# Patient Record
(demographics unavailable — no encounter records)

---

## 2024-10-11 NOTE — PHYSICAL EXAM
[General Appearance - Alert] : alert [General Appearance - In No Acute Distress] : in no acute distress [2+] : left foot posterior tibialis 2+ [1+] : left foot dorsalis pedis 1+ [Diminished Throughout Right Foot] : diminished sensation with monofilament testing throughout right foot [Diminished Throughout Left Foot] : diminished sensation with monofilament testing throughout left foot [Oriented To Time, Place, And Person] : oriented to person, place, and time [Impaired Insight] : insight and judgment were intact [Affect] : the affect was normal [Ankle Swelling (On Exam)] : not present [] : not present [Delayed in the Right Toes] : capillary refills normal in right toes [Delayed in the Left Toes] : capillary refills normal in the left toes [de-identified] : s/p left partial hallux amputation Semi-flexible left 2nd digital contracture, clawing Good alignment with normal arch height. Right ankle gastrocsoleal equinus. No pain on palpation to right calf and along the Achilles tendon Muscle Strength feet bilateral, 5/5. [FreeTextEntry1] : Pre-ulcerative hyperkeratosis to right hallux plantar medial IPJ and Left 2nd digit hyperkeratosis distal tip. No underlying opening upon debridement Nails R1-5, L2-5 painful, thickened, discolored, dystrophic with subungual debris. No open ulcerations or breaks in the integument bilateral.    well-healed surgical scar left residual hallux [Vibration Dec.] : normal vibratory sensation at the level of the toes

## 2024-10-11 NOTE — HISTORY OF PRESENT ILLNESS
[Sneakers] : santo [FreeTextEntry1] : Patient states he has no concerns and denies any pain. Patient tried on shoes with left toe filler and denies any discomfort.  Reports "vein/nerve" pain to the right lower leg at times  FBS:107  A1C; about 7  s/p left partial hallux amputation (8/1/23)

## 2024-10-11 NOTE — ASSESSMENT
[FreeTextEntry1] : Discussed diagnosis and treatment with patient Discussed etiology of symptoms patient is experiencing Aseptic debridement of painful hyperkeratotic lesions right hallux and left 2nd toe down to and including epidermal layer with sterile 15-blade s/p 9/10/24 aseptic debridement of mycotic toenail R1-5 and L2-5 with sterile nipper Discussed the importance of daily foot exams Discussed the importance of daily glucose control Discussed all signs and symptoms of local and regional infection and if they arise patient advised to come into the office or go to the emergency room. Continue diabetic shoes/inserts - 11/21/23 Continue amlactin Recommended over the counter Vitamin B-complex supplements Recommended over the counter toe sleeves  Discussed risk and benefits of flexor tenotomy left second toe due to persistent callus formation and chronic pain  return for follow up appointment 10 weeks from last visit for routine

## 2025-01-21 NOTE — HISTORY OF PRESENT ILLNESS
[Sneakers] : santo [FreeTextEntry1] : Patient states he has no concerns and denies any pain. Patient tried on shoes with left toe filler and denies any discomfort.  Complains of burning sensation sometimes on b/l feet. states his own a1c is good  FBS:97  A1C; about 7  s/p left partial hallux amputation (8/1/23)

## 2025-01-21 NOTE — REASON FOR VISIT
[Follow-Up Visit] : a follow-up visit for [FreeTextEntry2] : diabetic foot examination and left 2nd hammertoe

## 2025-01-21 NOTE — PHYSICAL EXAM
[General Appearance - Alert] : alert [General Appearance - In No Acute Distress] : in no acute distress [2+] : left foot posterior tibialis 2+ [1+] : left foot dorsalis pedis 1+ [Diminished Throughout Right Foot] : diminished sensation with monofilament testing throughout right foot [Diminished Throughout Left Foot] : diminished sensation with monofilament testing throughout left foot [Oriented To Time, Place, And Person] : oriented to person, place, and time [Impaired Insight] : insight and judgment were intact [Affect] : the affect was normal [Ankle Swelling (On Exam)] : not present [] : not present [Delayed in the Right Toes] : capillary refills normal in right toes [Delayed in the Left Toes] : capillary refills normal in the left toes [de-identified] : s/p left partial hallux amputation Semi-reducible left 2nd digital contracture, clawing Good alignment with normal arch height. Right ankle gastrocsoleal equinus. No pain on palpation to right calf and along the Achilles tendon Muscle Strength feet bilateral, 5/5. [FreeTextEntry1] : Pre-ulcerative hyperkeratotic lesions to right hallux plantar medial IPJ and Left 2nd digit hyperkeratosis distal tip. No underlying opening upon debridement Nails R1-5, L2-5 painful, thickened, discolored, dystrophic with subungual debris. No open ulcerations or breaks in the integument bilateral.    well-healed surgical scar left residual hallux [Vibration Dec.] : normal vibratory sensation at the level of the toes

## 2025-01-21 NOTE — ASSESSMENT
[FreeTextEntry1] : Discussed diagnosis and treatment with patient Discussed etiology of symptoms patient is experiencing 1/21/25 Aseptic debridement of painful hyperkeratotic lesions right hallux and left 2nd toe down to and including epidermal layer with sterile 15-blade 1/21/25 aseptic debridement of mycotic toenail R1-5 and L2-5 with sterile nipper Discussed the importance of daily foot exams and apply bandaid to left 2nd toe Discussed the importance of daily glucose control Discussed all signs and symptoms of local and regional infection and if they arise patient advised to come into the office or go to the emergency room. Continue diabetic shoes/inserts - 11/21/23 Continue amlactin Recommended over the counter Vitamin B-complex supplements Recommended over the counter toe sleeves Rx gabapentin 300 mg po QD at night Discussed risk and benefits of flexor tenotomy left second toe due to persistent callus formation and chronic pain. Patient is amenable to flexor tenotomy in 3 weeks return for follow up appointment 3 weeks for left 2nd flexor tenotomy

## 2025-03-14 NOTE — PHYSICAL EXAM
[General Appearance - Alert] : alert [General Appearance - In No Acute Distress] : in no acute distress [2+] : left foot posterior tibialis 2+ [1+] : left foot dorsalis pedis 1+ [Diminished Throughout Right Foot] : diminished sensation with monofilament testing throughout right foot [Diminished Throughout Left Foot] : diminished sensation with monofilament testing throughout left foot [Oriented To Time, Place, And Person] : oriented to person, place, and time [Impaired Insight] : insight and judgment were intact [Affect] : the affect was normal [Ankle Swelling (On Exam)] : not present [] : not present [Delayed in the Right Toes] : capillary refills normal in right toes [Delayed in the Left Toes] : capillary refills normal in the left toes [de-identified] : s/p left partial hallux amputation Semi-reducible left 2nd digital contracture, clawing Good alignment with normal arch height. Right ankle gastrocsoleal equinus. No pain on palpation to right calf and along the Achilles tendon Muscle Strength feet bilateral, 5/5. [FreeTextEntry1] : Fibrogranular wound bed noted to distal tip left 2nd toe Wound bed measuring 1x0.5x0.5 cm - 3/14/25. no purulence Nails R1-5, L2-5 painful, thickened, discolored, dystrophic with subungual debris. No open ulcerations or breaks in the integument bilateral.    well-healed surgical scar left residual hallux [Vibration Dec.] : normal vibratory sensation at the level of the toes

## 2025-03-14 NOTE — ASSESSMENT
[FreeTextEntry1] : Discussed diagnosis and treatment with patient Discussed etiology of symptoms patient is experiencing 1/21/25 Aseptic debridement of painful hyperkeratotic lesions right hallux and left 2nd toe down to and including epidermal layer with sterile 15-blade 1/21/25 aseptic debridement of mycotic toenail R1-5 and L2-5 with sterile nipper Ulcerative left 2nd toe distal tip lesion. Upon debridement 3/14/25, fibrogranular wound bed noted. Excisional debridement of left 2nd toe down to and including subq tissue with sterile nipper Hemostasis achieved with manual pressure and silver nitrate Applied SSD, gauze, light coban Patient to apply SSD and bandaid daily Discussed the importance of daily foot exams Discussed the importance of daily glucose control Discussed all signs and symptoms of local and regional infection and if they arise patient advised to come into the office or go to the emergency room. Continue diabetic shoes/inserts - 11/21/23 Continue amlactin Recommended over the counter Vitamin B-complex supplements Recommended over the counter toe sleeves Rx SSD Rx gabapentin 300 mg po QD at night Rx Augmentin 2 weeks Obtained/reviewed left foot xrays 3 views WB 3/14/25: cortical sclerosis 2nd distal phalanx. Increased soft tissue swelling left 2nd toe. No soft tissue emphysema. Discussed risk and benefits of flexor tenotomy left second toe due to persistent callus formation and chronic pain. Flexor tenotomy delayed due to reulceration. Will consider upon healing of new wound return for follow up appointment 1 week for wound care

## 2025-03-21 NOTE — REASON FOR VISIT
[Follow-Up Visit] : a follow-up visit for [Foot/Ankle Ulcer] : foot/ankle ulcer [FreeTextEntry2] : left 2nd toe wound

## 2025-03-21 NOTE — PHYSICAL EXAM
[General Appearance - Alert] : alert [General Appearance - In No Acute Distress] : in no acute distress [2+] : left foot posterior tibialis 2+ [1+] : left foot dorsalis pedis 1+ [Diminished Throughout Right Foot] : diminished sensation with monofilament testing throughout right foot [Diminished Throughout Left Foot] : diminished sensation with monofilament testing throughout left foot [Oriented To Time, Place, And Person] : oriented to person, place, and time [Impaired Insight] : insight and judgment were intact [Affect] : the affect was normal [Ankle Swelling (On Exam)] : not present [] : not present [Delayed in the Right Toes] : capillary refills normal in right toes [Delayed in the Left Toes] : capillary refills normal in the left toes [de-identified] : s/p left partial hallux amputation Semi-reducible left 2nd digital contracture, clawing Good alignment with normal arch height. Right ankle gastrocsoleal equinus. No pain on palpation to right calf and along the Achilles tendon Muscle Strength feet bilateral, 5/5. [FreeTextEntry1] : Fibrogranular wound bed noted to distal tip left 2nd toe Wound bed measuring 1x0.5x0.5 cm - 3/14/25. no purulence measures 1x0.2x0.3 cm - 3/21/25, epithelializing Nails R1-5, L2-5 painful, thickened, discolored, dystrophic with subungual debris. No open ulcerations or breaks in the integument bilateral.    well-healed surgical scar left residual hallux [Vibration Dec.] : normal vibratory sensation at the level of the toes

## 2025-03-21 NOTE — ASSESSMENT
[FreeTextEntry1] : Discussed diagnosis and treatment with patient Discussed etiology of symptoms patient is experiencing 1/21/25 Aseptic debridement of painful hyperkeratotic lesions right hallux and left 2nd toe down to and including epidermal layer with sterile 15-blade 1/21/25 aseptic debridement of mycotic toenail R1-5 and L2-5 with sterile nipper Ulcerative left 2nd toe distal tip lesion Excisional debridement of left 2nd toe down to and including subq tissue with sterile 15-blade. improving Hemostasis achieved with manual pressure and silver nitrate Applied SSD, gauze, light coban Patient to apply SSD and bandaid daily Discussed the importance of daily foot exams Discussed the importance of daily glucose control Discussed all signs and symptoms of local and regional infection and if they arise patient advised to come into the office or go to the emergency room. Continue diabetic shoes/inserts - 11/21/23 Continue amlactin Recommended over the counter Vitamin B-complex supplements Recommended over the counter toe sleeves Rx SSD Rx gabapentin 300 mg po QD at night Rx Augmentin 2 weeks Obtained/reviewed left foot xrays 3 views WB 3/14/25: cortical sclerosis 2nd distal phalanx. Increased soft tissue swelling left 2nd toe. No soft tissue emphysema. Discussed risk and benefits of flexor tenotomy left second toe due to persistent callus formation and chronic pain. Flexor tenotomy delayed due to reulceration. Will consider upon healing of new wound return for follow up appointment 1 week for wound care

## 2025-03-21 NOTE — HISTORY OF PRESENT ILLNESS
[Sneakers] : santo [FreeTextEntry1] : Presents in the [place] office for f/u of Ulcerative left 2nd toe distal tip lesion. He is taking po antibiotics and has been dressing it daily. Denies n/v/f/c  FBS:80 A1C; about 7  s/p left partial hallux amputation (8/1/23) reports minimal pain to left 2nd toe pain.

## 2025-03-28 NOTE — REASON FOR VISIT
Spoke with Mom. She reports that Macie is not sexually active but she is wanting to discuss OCP to aid Macie in planning for her menstrual cycle. I did advise a visit to discuss   [Follow-Up Visit] : a follow-up visit for [Foot/Ankle Ulcer] : foot/ankle ulcer [FreeTextEntry2] : left 2nd toe wound

## 2025-03-28 NOTE — PHYSICAL EXAM
[General Appearance - Alert] : alert [General Appearance - In No Acute Distress] : in no acute distress [2+] : left foot posterior tibialis 2+ [1+] : left foot dorsalis pedis 1+ [Diminished Throughout Right Foot] : diminished sensation with monofilament testing throughout right foot [Diminished Throughout Left Foot] : diminished sensation with monofilament testing throughout left foot [Oriented To Time, Place, And Person] : oriented to person, place, and time [Impaired Insight] : insight and judgment were intact [Affect] : the affect was normal [Ankle Swelling (On Exam)] : not present [] : not present [Delayed in the Right Toes] : capillary refills normal in right toes [Delayed in the Left Toes] : capillary refills normal in the left toes [de-identified] : s/p left partial hallux amputation Semi-reducible left 2nd digital contracture, clawing Good alignment with normal arch height. Right ankle gastrocsoleal equinus. No pain on palpation to right calf and along the Achilles tendon Muscle Strength feet bilateral, 5/5. [FreeTextEntry1] : Fibrogranular wound bed noted to distal tip left 2nd toe Wound bed measuring 1x0.5x0.5 cm - 3/14/25. no purulence measures 1x0.2x0.3 cm - 3/21/25, epithelializing Nails R1-5, L2-5 painful, thickened, discolored, dystrophic with subungual debris. No open ulcerations or breaks in the integument bilateral.    well-healed surgical scar left residual hallux [Vibration Dec.] : normal vibratory sensation at the level of the toes

## 2025-03-28 NOTE — ASSESSMENT
[FreeTextEntry1] : Discussed diagnosis and treatment with patient Discussed etiology of symptoms patient is experiencing 1/21/25 Aseptic debridement of painful hyperkeratotic lesions right hallux and left 2nd toe down to and including epidermal layer with sterile 15-blade 1/21/25 aseptic debridement of mycotic toenail R1-5 and L2-5 with sterile nipper Ulcerative left 2nd toe distal tip lesion Excisional debridement of left 2nd toe down to and including subq tissue with sterile 15-blade. improving Hemostasis achieved with manual pressure and silver nitrate Applied SSD, gauze, light coban Patient to apply SSD and bandaid daily Discussed the importance of daily foot exams Discussed the importance of daily glucose control Discussed all signs and symptoms of local and regional infection and if they arise patient advised to come into the office or go to the emergency room. Continue diabetic shoes/inserts - 11/21/23 Continue amlactin Recommended over the counter Vitamin B-complex supplements Recommended over the counter toe sleeves Rx SSD Rx gabapentin 300 mg po QD at night Rx Augmentin 2 weeks Obtained/reviewed left foot xrays 3 views WB 3/14/25: cortical sclerosis 2nd distal phalanx. Increased soft tissue swelling left 2nd toe. No soft tissue emphysema. Discussed risk and benefits of flexor tenotomy left second toe due to persistent callus formation and chronic pain. Flexor tenotomy delayed due to reulceration. Will consider upon healing of new wound return for follow up appointment 1 week for flexor tenotomy and wound repair

## 2025-03-28 NOTE — HISTORY OF PRESENT ILLNESS
[Sneakers] : santo [FreeTextEntry1] : Presents in the [place] office for f/u of Ulcerative left 2nd toe distal tip lesion. He is taking po antibiotics and has been dressing it daily with a band aid and SSD  . Denies n/v/f/c  FBS:103 A1C; about 7  s/p left partial hallux amputation (8/1/23) reports minimal pain to left 2nd toe pain.

## 2025-04-11 NOTE — HISTORY OF PRESENT ILLNESS
[Sneakers] : santo [FreeTextEntry1] : Presents in the [place] office for f/u left 2nd toe flexor tenotomy.  He is taking oral antibiotics and has been dressing it daily with a band aid and SSD  . Denies n/v/f/c   States he has minimal throbbing.  FBS:102 A1C; about 7  s/p left partial hallux amputation (8/1/23) reports minimal pain to left 2nd toe pain.

## 2025-04-11 NOTE — PHYSICAL EXAM
[General Appearance - Alert] : alert [General Appearance - In No Acute Distress] : in no acute distress [2+] : left foot posterior tibialis 2+ [1+] : left foot dorsalis pedis 1+ [Diminished Throughout Right Foot] : diminished sensation with monofilament testing throughout right foot [Diminished Throughout Left Foot] : diminished sensation with monofilament testing throughout left foot [Oriented To Time, Place, And Person] : oriented to person, place, and time [Impaired Insight] : insight and judgment were intact [Affect] : the affect was normal [Ankle Swelling (On Exam)] : not present [] : not present [Delayed in the Right Toes] : capillary refills normal in right toes [Delayed in the Left Toes] : capillary refills normal in the left toes [de-identified] : s/p left partial hallux 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 Right ankle gastrocsoleal equinus. No pain on palpation to right calf and along the Achilles tendon Muscle Strength feet bilateral, 5/5. [FreeTextEntry1] : Mostly granular wound bed noted to distal tip left 2nd toe Wound bed measuring 1x0.5x0.5 cm - 3/14/25. no purulence measures 1x0.2x0.3 cm - 3/21/25; 4/11/25 Nails R1-5, L2-5 painful, thickened, discolored, dystrophic with subungual debris. No open ulcerations or breaks in the integument bilateral.    well-healed surgical scar left residual hallux [Vibration Dec.] : normal vibratory sensation at the level of the toes

## 2025-04-11 NOTE — ASSESSMENT
[FreeTextEntry1] : Discussed diagnosis and treatment with patient Discussed etiology of symptoms patient is experiencing 1/21/25 Aseptic debridement of painful hyperkeratotic lesions right hallux and left 2nd toe down to and including epidermal layer with sterile 15-blade 1/21/25 aseptic debridement of mycotic toenail R1-5 and L2-5 with sterile nipper s/p 3/28/25 Excisional debridement of left 2nd toe down to and including subq tissue with sterile 15-blade. 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 contracture and lack of hallux Verbal consent obtained for left 2nd toe wound repair at bedside due to persistent opening Excisionally debrided left 2nd toe wound down to and including subq tissue with sterile curette Reapproximated the wound edges with 3-0 nylon without opening Applied betadine, gauze, light coban Rx Augmentin 875 mg po BID for 10 days and betadine 10% topical to be applied every other day with gauze and tape Discussed the importance of daily foot exams Discussed the importance of daily glucose control Discussed all signs and symptoms of local and regional infection and if they arise patient advised to come into the office or go to the emergency room. Continue diabetic shoes/inserts - 11/21/23 Continue amlactin Recommended over the counter Vitamin B-complex supplements Continue gabapentin 300 mg po QD at night Reviewed left foot xrays 3 views WB 3/14/25: cortical sclerosis 2nd distal phalanx. Increased soft tissue swelling left 2nd toe. No soft tissue emphysema. Rreturn for follow up appointment 1 week

## 2025-04-18 NOTE — ASSESSMENT
[FreeTextEntry1] : Discussed diagnosis and treatment with patient Discussed etiology of symptoms patient is experiencing 1/21/25 Aseptic debridement of painful hyperkeratotic lesions right hallux and left 2nd toe down to and including epidermal layer with sterile 15-blade 1/21/25 aseptic debridement of mycotic toenail R1-5 and L2-5 with sterile nipper s/p 3/28/25 Excisional debridement of left 2nd toe down to and including subq tissue with sterile 15-blade. 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 contracture and lack of hallux Verbal consent obtained for left 2nd toe wound repair at bedside due to persistent opening Excisionally debrided left 2nd toe wound down to and including subq tissue with sterile curette Reapproximated the wound edges with 3-0 nylon without opening Applied betadine, bandaid Rx Augmentin 875 mg po BID for 10 days and betadine 10% topical to be applied every other day with gauze and tape Discussed the importance of daily foot exams Discussed the importance of daily glucose control Discussed all signs and symptoms of local and regional infection and if they arise patient advised to come into the office or go to the emergency room. Continue diabetic shoes/inserts - 11/21/23 Continue amlactin Recommended over the counter Vitamin B-complex supplements Continue gabapentin 300 mg po QD at night Reviewed left foot xrays 3 views WB 3/14/25: cortical sclerosis 2nd distal phalanx. Increased soft tissue swelling left 2nd toe. No soft tissue emphysema. Rreturn for follow up appointment 1 week

## 2025-04-18 NOTE — PHYSICAL EXAM
[General Appearance - Alert] : alert [General Appearance - In No Acute Distress] : in no acute distress [2+] : left foot posterior tibialis 2+ [1+] : left foot dorsalis pedis 1+ [Diminished Throughout Right Foot] : diminished sensation with monofilament testing throughout right foot [Diminished Throughout Left Foot] : diminished sensation with monofilament testing throughout left foot [Oriented To Time, Place, And Person] : oriented to person, place, and time [Impaired Insight] : insight and judgment were intact [Affect] : the affect was normal [Ankle Swelling (On Exam)] : not present [] : not present [Delayed in the Right Toes] : capillary refills normal in right toes [Delayed in the Left Toes] : capillary refills normal in the left toes [de-identified] : s/p left partial hallux 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 Right ankle gastrocsoleal equinus. No pain on palpation to right calf and along the Achilles tendon Muscle Strength feet bilateral, 5/5. [FreeTextEntry1] : Mostly granular wound bed noted to distal tip left 2nd toe Wound bed measuring 1x0.5x0.5 cm - 3/14/25. no purulence measures 1x0.2x0.3 cm - 3/21/25; 4/11/25 Nails R1-5, L2-5 painful, thickened, discolored, dystrophic with subungual debris. No open ulcerations or breaks in the integument bilateral.    well-healed surgical scar left residual hallux [Vibration Dec.] : normal vibratory sensation at the level of the toes

## 2025-04-18 NOTE — HISTORY OF PRESENT ILLNESS
[Sneakers] : santo [FreeTextEntry1] : Presents in the [place] office for wound check 2nd left. He is taking oral antibiotics and has been dressing it daily with a band aid and SSD  . Denies n/v/f/c   States he has minimal throbbing. Patient presents with bandaid.  FBS:140 A1C; about 7  s/p left partial hallux amputation (8/1/23) reports minimal pain to left 2nd toe pain. has been applying SSD as was instructed prior.

## 2025-04-25 NOTE — REASON FOR VISIT
[Follow-Up Visit] : a follow-up visit for [Foot/Ankle Ulcer] : foot/ankle ulcer [FreeTextEntry2] : left 2nd toe wound s/p flexor tenotomy Left 2nd toe 4/4/25

## 2025-04-25 NOTE — PHYSICAL EXAM
[General Appearance - Alert] : alert [General Appearance - In No Acute Distress] : in no acute distress [2+] : left foot posterior tibialis 2+ [1+] : left foot dorsalis pedis 1+ [Diminished Throughout Right Foot] : diminished sensation with monofilament testing throughout right foot [Diminished Throughout Left Foot] : diminished sensation with monofilament testing throughout left foot [Oriented To Time, Place, And Person] : oriented to person, place, and time [Impaired Insight] : insight and judgment were intact [Affect] : the affect was normal [Ankle Swelling (On Exam)] : not present [] : not present [Delayed in the Right Toes] : capillary refills normal in right toes [Delayed in the Left Toes] : capillary refills normal in the left toes [de-identified] : s/p left partial hallux 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 Right ankle gastrocsoleal equinus. No pain on palpation to right calf and along the Achilles tendon Muscle Strength feet bilateral, 5/5. [FreeTextEntry1] : Mostly granular wound bed noted to distal tip left 2nd toe Wound bed measuring 1x0.5x0.5 cm - 3/14/25. no purulence measures 1x0.2x0.3 cm - 3/21/25; 4/11/25 Nails R1-5, L2-5 painful, thickened, discolored, dystrophic with subungual debris. No open ulcerations or breaks in the integument bilateral.    well-healed surgical scar left residual hallux [Vibration Dec.] : normal vibratory sensation at the level of the toes

## 2025-04-25 NOTE — HISTORY OF PRESENT ILLNESS
[Sneakers] : santo [FreeTextEntry1] : Presents in the [place] office for wound check 2nd left. He is taking oral antibiotics and has been dressing it daily with a band aid and SSD  . Denies n/v/f/c   States he has minimal throbbing. Patient presents with bandaid.  FBS:140 A1C; about 7  s/p left partial hallux amputation (8/1/23) reports minimal pain to left 2nd toe pain. has been applying SSD as was instructed prior. Update 04/25 Patient is present for wound check 2nd left. Patient is still on oral antibiotic he is missing half the bottle. Patient is present with the wound covered with a band-aid on. Patient denies any pain.

## 2025-04-25 NOTE — ASSESSMENT
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 Continue diabetic shoes/inserts - 11/21/23 Continue amlactin Recommended over the counter Vitamin B-complex supplements Continue gabapentin 300 mg po QD at night Reviewed left foot xrays 3 views WB 3/14/25: cortical sclerosis 2nd distal phalanx. Increased soft tissue swelling left 2nd toe. No soft tissue emphysema. Rreturn for follow up appointment 1 week

## 2025-05-02 NOTE — PHYSICAL EXAM
[General Appearance - Alert] : alert [General Appearance - In No Acute Distress] : in no acute distress [2+] : left foot posterior tibialis 2+ [1+] : left foot dorsalis pedis 1+ [Diminished Throughout Right Foot] : diminished sensation with monofilament testing throughout right foot [Diminished Throughout Left Foot] : diminished sensation with monofilament testing throughout left foot [Oriented To Time, Place, And Person] : oriented to person, place, and time [Impaired Insight] : insight and judgment were intact [Affect] : the affect was normal [Ankle Swelling (On Exam)] : not present [] : not present [Delayed in the Right Toes] : capillary refills normal in right toes [Delayed in the Left Toes] : capillary refills normal in the left toes [FreeTextEntry3] : no ischemic changes [de-identified] : 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 Right ankle gastrocsoleal equinus. No pain on palpation to right calf and along the Achilles tendon Muscle Strength feet bilateral, 5/5. [FreeTextEntry1] : Mostly granular wound bed noted to distal tip left 2nd toe Wound bed measuring 1x0.5x0.5 cm - 3/14/25. no purulence measures 1x0.2x0.3 cm - 3/21/25; 4/11/25 epithelialized as of 5/2/25 Nails R1-5, L2-5 painful, thickened, discolored, dystrophic with subungual debris. No open ulcerations or breaks in the integument bilateral.    well-healed surgical scar left residual hallux [Vibration Dec.] : normal vibratory sensation at the level of the toes

## 2025-05-02 NOTE — HISTORY OF PRESENT ILLNESS
[Sneakers] : santo [FreeTextEntry1] : Presents in the [place] office for wound check 2nd left. Patient presents with band aid. Patient continues to take oral antibiotics. Patient denies pain at the moment  FBS:125  A1C; about 7  h/o left partial hallux amputation (8/1/23)

## 2025-05-02 NOTE — ASSESSMENT
[FreeTextEntry1] : Discussed diagnosis and treatment with patient Discussed etiology of symptoms patient is experiencing Reviewed left foot xrays 3 views WB 3/14/25: cortical sclerosis 2nd distal phalanx. Increased soft tissue swelling left 2nd toe. No soft tissue emphysema. 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 Continue diabetic shoes/inserts - 11/21/23 Continue amlactin Recommended over the counter Vitamin B-complex supplements Continue gabapentin 300 mg po QD at night Return for follow up in 2 weeks for maintenance

## 2025-05-16 NOTE — HISTORY OF PRESENT ILLNESS
[Sneakers] : santo [FreeTextEntry1] : Presents in the [place] office for wound check 2nd left. Patient presents with no dressing. States wound has closed  Patient continues to take oral antibiotics. Patient denies pain at the moment  FBS:110  A1C: about 7  h/o left partial hallux amputation (8/1/23)

## 2025-05-16 NOTE — ASSESSMENT
[FreeTextEntry1] : Discussed diagnosis and treatment with patient Discussed etiology of symptoms patient is experiencing Reviewed left foot xrays 3 views WB 3/14/25: cortical sclerosis 2nd distal phalanx. Increased soft tissue swelling left 2nd toe. No soft tissue emphysema. s/p 5/2/25 Aseptic debridement of painful hyperkeratotic lesion left 2nd toe down to and including epidermal layer with sterile 15-blade s/p 5/2/25 Aseptic debridement of mycotic toenail R1-5 and L2-5 with sterile nipper s/p flexor tenotomy Left 2nd toe 4/4/25 Residual hammertoe deformity noted likely due to MTPJ contracture and h/o hallux amputation Applied betadine, bandaid Completed Augmentin 875 mg po BID for 10 days Discussed the importance of daily foot exams Discussed the importance of daily glucose control Discussed all signs and symptoms of local and regional infection and if they arise patient advised to come into the office or go to the emergency room. Continue diabetic shoes/inserts - 11/21/23 Continue amlactin Recommended over the counter Vitamin B-complex supplements Continue gabapentin 300 mg po QD at night Return for follow up in 3 weeks for maintenance

## 2025-05-16 NOTE — PHYSICAL EXAM
[General Appearance - Alert] : alert [General Appearance - In No Acute Distress] : in no acute distress [2+] : left foot posterior tibialis 2+ [1+] : left foot dorsalis pedis 1+ [Diminished Throughout Right Foot] : diminished sensation with monofilament testing throughout right foot [Diminished Throughout Left Foot] : diminished sensation with monofilament testing throughout left foot [Oriented To Time, Place, And Person] : oriented to person, place, and time [Impaired Insight] : insight and judgment were intact [Affect] : the affect was normal [Ankle Swelling (On Exam)] : not present [] : not present [Delayed in the Right Toes] : capillary refills normal in right toes [Delayed in the Left Toes] : capillary refills normal in the left toes [FreeTextEntry3] : no ischemic changes [de-identified] : 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 Right ankle gastrocsoleal equinus. No pain on palpation to right calf and along the Achilles tendon Muscle Strength feet bilateral, 5/5. [FreeTextEntry1] : Distal tip left 2nd toe hyperkeratosis remains epithelialized - 5/16/25 Nails R1-5, L2-5 painful, thickened, discolored, dystrophic with subungual debris. No open ulcerations or breaks in the integument bilateral.    well-healed surgical scar left residual hallux [Vibration Dec.] : normal vibratory sensation at the level of the toes

## 2025-06-06 NOTE — HISTORY OF PRESENT ILLNESS
[Sneakers] : santo [FreeTextEntry1] : Presents in the [place] office for wound check 2nd toe left.  Patient completed oral antibiotics. Denies noticing any opening. Patient is content and denies acute pain. fbs:134 A1C: about 7  h/o left partial hallux amputation (8/1/23)

## 2025-06-06 NOTE — PHYSICAL EXAM
[General Appearance - Alert] : alert [General Appearance - In No Acute Distress] : in no acute distress [2+] : left foot posterior tibialis 2+ [1+] : left foot dorsalis pedis 1+ [Diminished Throughout Right Foot] : diminished sensation with monofilament testing throughout right foot [Diminished Throughout Left Foot] : diminished sensation with monofilament testing throughout left foot [Oriented To Time, Place, And Person] : oriented to person, place, and time [Impaired Insight] : insight and judgment were intact [Affect] : the affect was normal [Ankle Swelling (On Exam)] : not present [] : not present [Delayed in the Right Toes] : capillary refills normal in right toes [Delayed in the Left Toes] : capillary refills normal in the left toes [FreeTextEntry3] : no ischemic changes [de-identified] : h/o left partial hallux 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 Right ankle gastrocsoleal equinus. No pain on palpation to right calf and along the Achilles tendon Muscle Strength feet bilateral, 5/5. [FreeTextEntry1] : Distal tip left 2nd toe pre-hyperkeratosis remains epithelialized - 5/16/25 Nails R1-5, L2-5 painful, thickened, discolored, dystrophic with subungual debris. No open ulcerations or breaks in the integument bilateral.    well-healed surgical scar left residual hallux [Vibration Dec.] : normal vibratory sensation at the level of the toes

## 2025-06-06 NOTE — ASSESSMENT
[FreeTextEntry1] : Discussed diagnosis and treatment with patient Discussed etiology of symptoms patient is experiencing Reviewed left foot xrays 3 views WB 3/14/25: cortical sclerosis 2nd distal phalanx. Increased soft tissue swelling left 2nd toe. No soft tissue emphysema. s/p 5/2/25 Aseptic debridement of painful hyperkeratotic lesion left 2nd toe down to and including epidermal layer with sterile 15-blade s/p 5/2/25 Aseptic debridement of mycotic toenail R1-5 and L2-5 with sterile nipper s/p flexor tenotomy Left 2nd toe 4/4/25 Residual hammertoe deformity noted likely due to MTPJ contracture and h/o hallux amputation No opening, healed wound. Completed Augmentin 875 mg po BID for 10 days Discussed the importance of daily foot exams Discussed the importance of daily glucose control Discussed all signs and symptoms of local and regional infection and if they arise patient advised to come into the office or go to the emergency room. Continue diabetic shoes/inserts - 11/21/23 Continue amlactin Recommended over the counter Vitamin B-complex supplements Continue gabapentin 300 mg po QD at night Return for follow up in 5-6 weeks to continue routine

## 2025-07-18 NOTE — HISTORY OF PRESENT ILLNESS
[Sneakers] : santo [FreeTextEntry1] : Presents in the [place] office for diabetic foot examination.  Has complains of pain on the plantar of both feet, describes pain as numbness. States he has pain on right hallux, describes pain as random pain. Patient states he continues take gabapentin.  fbs:123 A1C: 7  h/o left partial hallux amputation (8/1/23)

## 2025-07-18 NOTE — ASSESSMENT
[FreeTextEntry1] : Discussed diagnosis and treatment with patient Discussed etiology of symptoms patient is experiencing Reviewed left foot xrays 3 views WB 3/14/25: cortical sclerosis 2nd distal phalanx. Increased soft tissue swelling left 2nd toe. No soft tissue emphysema. 7/18/25 Aseptic debridement of painful hyperkeratotic lesions right hallux and left 2nd toe down to and including epidermal layer with sterile 15-blade 7/18/25 Aseptic debridement of mycotic toenail R1-5 and L2-5 with sterile nipper s/p flexor tenotomy Left 2nd toe 4/4/25 Residual hammertoe deformity noted likely due to MTPJ contracture and h/o hallux amputation No opening, healed wound. Completed Augmentin 875 mg po BID for 10 days Discussed the importance of daily foot exams Discussed the importance of daily glucose control Discussed all signs and symptoms of local and regional infection and if they arise patient advised to come into the office or go to the emergency room. Continue diabetic shoes/inserts - 11/21/23 Continue amlactin Recommended over the counter Vitamin B-complex supplements Continue gabapentin 300 mg po QD at night Return for follow up in 1 month for pain due to preulcerative hyperkeratosis

## 2025-07-18 NOTE — PHYSICAL EXAM
[General Appearance - Alert] : alert [General Appearance - In No Acute Distress] : in no acute distress [2+] : left foot posterior tibialis 2+ [1+] : left foot dorsalis pedis 1+ [Diminished Throughout Right Foot] : diminished sensation with monofilament testing throughout right foot [Diminished Throughout Left Foot] : diminished sensation with monofilament testing throughout left foot [Oriented To Time, Place, And Person] : oriented to person, place, and time [Impaired Insight] : insight and judgment were intact [Affect] : the affect was normal [Ankle Swelling (On Exam)] : not present [] : not present [Delayed in the Right Toes] : capillary refills normal in right toes [Delayed in the Left Toes] : capillary refills normal in the left toes [FreeTextEntry3] : no ischemic changes [de-identified] : h/o left partial hallux 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 Right ankle gastrocsoleal equinus. No pain on palpation to right calf and along the Achilles tendon Muscle Strength feet bilateral, 5/5. [FreeTextEntry1] : Pre-ulcerative hyperkeratosis right hallux Distal tip left 2nd toe pre-hyperkeratosis remains epithelialized - 5/16/25 Nails R1-5, L2-5 painful, thickened, discolored, dystrophic with subungual debris. No open ulcerations or breaks in the integument bilateral.    well-healed surgical scar left residual hallux [Vibration Dec.] : normal vibratory sensation at the level of the toes